# Patient Record
Sex: FEMALE | Race: WHITE | NOT HISPANIC OR LATINO | Employment: OTHER | URBAN - METROPOLITAN AREA
[De-identification: names, ages, dates, MRNs, and addresses within clinical notes are randomized per-mention and may not be internally consistent; named-entity substitution may affect disease eponyms.]

---

## 2017-10-26 ENCOUNTER — HOSPITAL ENCOUNTER (OUTPATIENT)
Dept: RADIOLOGY | Age: 62
Discharge: HOME | End: 2017-10-26
Payer: COMMERCIAL

## 2017-10-26 DIAGNOSIS — Z12.31 ENCOUNTER FOR SCREENING MAMMOGRAM FOR MALIGNANT NEOPLASM OF BREAST: ICD-10-CM

## 2017-10-26 PROCEDURE — G0202 SCR MAMMO BI INCL CAD: HCPCS

## 2018-07-13 ENCOUNTER — OFFICE VISIT (OUTPATIENT)
Dept: URGENT CARE | Age: 63
End: 2018-07-13
Payer: MEDICARE

## 2018-07-13 VITALS
WEIGHT: 167.8 LBS | RESPIRATION RATE: 16 BRPM | SYSTOLIC BLOOD PRESSURE: 169 MMHG | BODY MASS INDEX: 28.36 KG/M2 | HEART RATE: 68 BPM | DIASTOLIC BLOOD PRESSURE: 92 MMHG | OXYGEN SATURATION: 100 %

## 2018-07-13 DIAGNOSIS — S61.216A LACERATION OF RIGHT LITTLE FINGER WITHOUT FOREIGN BODY WITHOUT DAMAGE TO NAIL, INITIAL ENCOUNTER: Primary | ICD-10-CM

## 2018-07-13 PROCEDURE — G0463 HOSPITAL OUTPT CLINIC VISIT: HCPCS | Performed by: FAMILY MEDICINE

## 2018-07-13 PROCEDURE — 12001 RPR S/N/AX/GEN/TRNK 2.5CM/<: CPT | Performed by: FAMILY MEDICINE

## 2018-07-13 PROCEDURE — 99214 OFFICE O/P EST MOD 30 MIN: CPT | Performed by: FAMILY MEDICINE

## 2018-07-13 RX ORDER — TRIAMCINOLONE ACETONIDE 1 MG/G
CREAM TOPICAL 2 TIMES DAILY
COMMUNITY
Start: 2018-03-19 | End: 2019-06-26

## 2018-07-13 RX ORDER — PREDNISONE 10 MG/1
TABLET ORAL
COMMUNITY
Start: 2012-12-03 | End: 2018-10-25 | Stop reason: ALTCHOICE

## 2018-07-13 RX ORDER — EPINEPHRINE 0.3 MG/.3ML
INJECTION SUBCUTANEOUS
COMMUNITY
Start: 2012-09-12

## 2018-07-13 RX ORDER — ALPRAZOLAM 0.5 MG/1
1 TABLET ORAL DAILY
COMMUNITY
Start: 2018-04-13

## 2018-07-13 RX ORDER — LEVOTHYROXINE SODIUM 0.03 MG/1
TABLET ORAL
COMMUNITY
Start: 2013-01-26

## 2018-07-13 RX ORDER — FLUTICASONE PROPIONATE 50 MCG
2 SPRAY, SUSPENSION (ML) NASAL
COMMUNITY
Start: 2017-11-20 | End: 2019-06-26

## 2018-07-14 NOTE — PATIENT INSTRUCTIONS
Keep hand clean using soap and water  Pat dry  You may apply an antibiotic ointment such as triple antibiotic ointment, as desired  Monitor for signs of worsening infection including increased redness, streaking redness, pus drainage, fever, and chills  If these occur seek care immediately  Follow up with your family doctor in 5-7 days  Proceed to the ER if symptoms worsen  Care For Your Stitches   WHAT YOU NEED TO KNOW:   Stitches, or sutures, are used to close cuts and wounds on the skin  Stitches need to be removed after your wound has healed  DISCHARGE INSTRUCTIONS:   Return to the emergency department if:   · Your stitches come apart  · Blood soaks through your bandages  · You suddenly cannot move your injured joint  · You have sudden numbness around your wound  · You see red streaks coming from your wound  Contact your healthcare provider if:   · You have a fever and chills  · Your wound is red, warm, swollen, or leaking pus  · There is a bad smell coming from your wound  · You have increased pain in the wound area  · You have questions or concerns about your condition or care  Care for your stitches:  Keep your stitches clean and dry  You may need to cover your stitches with a bandage for 24 to 48 hours, or as directed  Do not bump or hit the suture area, as this could open the wound  Do not trim or shorten the ends of your stitches  If they rub on your clothing, put a gauze bandage between the stitches and your clothes  Clean your wound:  Carefully wash your wound with soap and water  For mouth and lip wounds, rinse your mouth after meals and at bedtime  Ask your healthcare provider what to use to rinse your mouth  If you have a scalp wound, you may gently wash your hair every 2 days with mild shampoo  Do not use hair products, such as hair spray  Help your wound heal:   · Elevate  your wound above the level of your heart as often as you can   This will help decrease swelling and pain  Prop your wound on pillows or blankets to keep it elevated comfortably  · Limit activity  Do not stretch the skin around your wound  This will help prevent bleeding and swelling of the wound area  Follow up with your healthcare provider as directed: You may need to return to have your stitches removed  Write down your questions so you remember to ask them during your visits  © 2017 [employer] Information is for End User's use only and may not be sold, redistributed or otherwise used for commercial purposes  All illustrations and images included in CareNotes® are the copyrighted property of [employer] , [employer]  or Jed Ospina  The above information is an  only  It is not intended as medical advice for individual conditions or treatments  Talk to your doctor, nurse or pharmacist before following any medical regimen to see if it is safe and effective for you

## 2018-07-17 NOTE — PROGRESS NOTES
[facility]        NAME: Miriam Ochoa is a 70 y o  female  : 1955    MRN: [id number]  DATE: 2018  TIME: 8:25 PM    Assessment and Plan   Laceration of right little finger without foreign body without damage to nail, initial encounter [S61 216A]  1  Laceration of right little finger without foreign body without damage to nail, initial encounter  Laceration repair       Patient Instructions   Keep hand clean using soap and water  Pat dry  You may apply an antibiotic ointment such as triple antibiotic ointment, as desired  Monitor for signs of worsening infection including increased redness, streaking redness, pus drainage, fever, and chills  If these occur seek care immediately  Follow up with your family doctor in 5-7 days  Proceed to the ER if symptoms worsen  Pt to return to this office or seek care by PCP for suture removal in 7 days  Chief Complaint     Chief Complaint   Patient presents with    Laceration     Pt cut her finger on a glass dish tonight         History of Present Illness        78-year-old female presents for evaluation of laceration of right small finger x1 day  She notes that just prior to presentation she was washing dishes and dropped a large glass bowl into her sink  In an attempt to catch the the bowl, a piece of shattered glass cut her finger  She did attempt to clean using soap and water however presented due to inability to stop bleeding  She denies taking any blood thinners  No numbness, tingling, or weakness  She admits to being out of date on her tetanus vaccine but does not wish to have vaccine administered noting allergy to components of vaccine and preferring her PCP to update her if necessary  Review of Systems   Review of Systems   Constitutional: Negative for chills and fever  Respiratory: Negative for cough, shortness of breath and wheezing  Cardiovascular: Negative for chest pain and palpitations     Gastrointestinal: Negative for abdominal pain and nausea  Neurological: Negative for dizziness, weakness, light-headedness, numbness and headaches  Current Medications       Current Outpatient Prescriptions:     ALPRAZolam (XANAX) 0 5 mg tablet, Take 1 mg by mouth daily, Disp: , Rfl:     EPINEPHrine (EPIPEN) 0 3 mg/0 3 mL SOAJ, Inject as directed, Disp: , Rfl:     fluocinonide (LIDEX) 0 05 % cream, Apply topically 2 (two) times a day as needed, Disp: , Rfl:     fluticasone (FLONASE) 50 mcg/act nasal spray, 2 sprays into each nostril, Disp: , Rfl:     levothyroxine (SYNTHROID) 25 mcg tablet, Take by mouth, Disp: , Rfl:     predniSONE 10 mg tablet, Take by mouth, Disp: , Rfl:     triamcinolone (KENALOG) 0 1 % cream, Apply topically 2 (two) times a day, Disp: , Rfl:     Cetirizine HCl 10 MG CAPS, Take 1 tablet by mouth, Disp: , Rfl:     Melatonin 5 MG/15ML LIQD, Take 2 mg by mouth, Disp: , Rfl:     Selenium 200 MCG CAPS, Take 1 capsule by mouth, Disp: , Rfl:     Current Allergies     Allergies as of 07/13/2018 - Reviewed 07/13/2018   Allergen Reaction Noted    Penicillins Rash 11/19/2015    Food  09/10/2012    Gluten meal  05/22/2015    Latex  09/10/2012    Sulfa antibiotics Lip Swelling 09/10/2012    Zolpidem Headache 09/10/2012    Amoxicillin Rash 12/01/2012            The following portions of the patient's history were reviewed and updated as appropriate: allergies, current medications, past family history, past medical history, past social history, past surgical history and problem list      No past medical history on file  No past surgical history on file  No family history on file  Medications have been verified  Objective   /92   Pulse 68   Resp 16   Wt 76 1 kg (167 lb 12 8 oz)   SpO2 100%   BMI 28 36 kg/m²          Physical Exam     Physical Exam   Constitutional: She is oriented to person, place, and time  She appears well-developed and well-nourished  No distress  HENT:   Head: Normocephalic and atraumatic  Eyes: Conjunctivae are normal    Cardiovascular: Normal rate, regular rhythm and normal heart sounds  Pulmonary/Chest: Effort normal and breath sounds normal  No respiratory distress  She has no decreased breath sounds  She has no wheezes  She has no rhonchi  She has no rales  Neurological: She is alert and oriented to person, place, and time  No cranial nerve deficit  She exhibits normal muscle tone  Coordination normal    Skin: Skin is warm and dry  No rash noted  She is not diaphoretic    ~0 5 cm avulsion laceration of the R little finger  Bleeding persists  No visible foreign bodies  Surrounding skin appears clean and dry  FROM of small finger joints  Distal sensation intact  Capillary refill <3 seconds  Psychiatric: She has a normal mood and affect  Her behavior is normal    Nursing note and vitals reviewed  Laceration repair  Date/Time: 7/16/2018 8:23 PM  Performed by: Lorena Romero  Authorized by: Lorena Romero   Consent: Verbal consent obtained  Consent given by: patient  Patient identity confirmed: verbally with patient  Body area: upper extremity  Location details: right small finger  Laceration length: 0 5 cm  Foreign bodies: no foreign bodies  Tendon involvement: none  Nerve involvement: none  Vascular damage: no  Anesthesia: local infiltration    Anesthesia:  Local Anesthetic: lidocaine 1% without epinephrine  Anesthetic total: 1 5 mL      Procedure Details:  Preparation: Patient was prepped and draped in the usual sterile fashion  Irrigation solution: saline  Irrigation method: syringe  Amount of cleaning: standard  Debridement: none  Degree of undermining: none  Skin closure: 4-0 nylon and Ethilon  Number of sutures: 3    Technique: simple  Approximation: close  Approximation difficulty: simple  Dressing: antibiotic ointment (bandage)  Patient tolerance: Patient tolerated the procedure well with no immediate complications

## 2018-10-25 ENCOUNTER — OFFICE VISIT (OUTPATIENT)
Dept: URGENT CARE | Age: 63
End: 2018-10-25
Payer: COMMERCIAL

## 2018-10-25 VITALS
OXYGEN SATURATION: 99 % | DIASTOLIC BLOOD PRESSURE: 67 MMHG | HEART RATE: 86 BPM | WEIGHT: 177 LBS | HEIGHT: 65 IN | TEMPERATURE: 97.9 F | BODY MASS INDEX: 29.49 KG/M2 | SYSTOLIC BLOOD PRESSURE: 122 MMHG | RESPIRATION RATE: 16 BRPM

## 2018-10-25 DIAGNOSIS — S30.861A TICK BITE OF ABDOMEN, INITIAL ENCOUNTER: Primary | ICD-10-CM
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
## 2018-10-25 PROCEDURE — S9083 URGENT CARE CENTER GLOBAL: HCPCS | Performed by: FAMILY MEDICINE

## 2018-10-25 PROCEDURE — G0382 LEV 3 HOSP TYPE B ED VISIT: HCPCS | Performed by: FAMILY MEDICINE

## 2018-10-25 RX ORDER — DOXYCYCLINE 100 MG/1
200 TABLET ORAL ONCE
Qty: 2 TABLET | Refills: 0 | Status: SHIPPED | OUTPATIENT
Start: 2018-10-25 | End: 2018-10-25

## 2018-10-25 RX ORDER — AZELASTINE 1 MG/ML
1 SPRAY, METERED NASAL 2 TIMES DAILY
COMMUNITY

## 2018-10-25 NOTE — PATIENT INSTRUCTIONS
Take all antibiotics as prescribed for tick exposure/Lyme prophylaxis    Call PCP to schedule a follow-up appt in the next few days for reevaluation and to ensure resolution of symptoms and for Lyme testing  Go to the nearest ER for evaluation if any fevers, redness, warmth, discharge, excessive bleeding, pain, signs of infection, new or worsening symptoms, or other concerning symptoms

## 2018-10-25 NOTE — PROGRESS NOTES
[facility]        NAME: Hamida Danielle is a 70 y o  female  : 1955    MRN: [id number]  DATE: 2018  TIME: 7:04 PM    Assessment and Plan   Tick bite of abdomen, initial encounter [H58 200E, W57  XXXA]  1  Tick bite of abdomen, initial encounter  doxycycline (ADOXA) 100 MG tablet   2  Tick bite with subsequent removal of tick           Patient Instructions       Take all antibiotics as prescribed for tick exposure/Lyme prophylaxis    Call PCP to schedule a follow-up appt in the next few days for reevaluation and to ensure resolution of symptoms and for Lyme testing  Go to the nearest ER for evaluation if any fevers, redness, warmth, discharge, excessive bleeding, pain, signs of infection, new or worsening symptoms, or other concerning symptoms  Chief Complaint     Chief Complaint   Patient presents with    Tick Removal     NOT SURE WHEN IT OCCURED    History of Present Illness       78-year-old female presents with an engorged tick to the right side of her abdomen  States she noticed it today  [place]  Did not notice a tick earlier this week so thinks that must have got there in the past 48 hours or so  Some mild redness around the tick, but no other rashes  No fevers, no joint pain or other complaints  States she has had ticks before but she could not get this one off  Declines tetnus at this time  Review of Systems   Review of Systems   Constitutional: Negative for chills and fever  HENT: Negative for facial swelling, sore throat, trouble swallowing and voice change  Eyes: Negative for visual disturbance  Respiratory: Negative for cough and shortness of breath  Cardiovascular: Negative for chest pain  Gastrointestinal: Negative for abdominal pain, nausea and vomiting  Musculoskeletal: Negative for arthralgias, back pain and myalgias  Skin: Negative for rash          Engorged Tick on right side of abdomen   Neurological: Negative for dizziness, syncope, facial asymmetry, numbness and headaches  All other systems reviewed and are negative          Current Medications       Current Outpatient Prescriptions:     ALPRAZolam (XANAX) 0 5 mg tablet, Take 1 mg by mouth daily, Disp: , Rfl:     azelastine (ASTELIN) 0 1 % nasal spray, 1 spray into each nostril 2 (two) times a day Use in each nostril as directed, Disp: , Rfl:     Cetirizine HCl 10 MG CAPS, Take 1 tablet by mouth, Disp: , Rfl:     fluticasone (FLONASE) 50 mcg/act nasal spray, 2 sprays into each nostril, Disp: , Rfl:     levothyroxine (SYNTHROID) 25 mcg tablet, Take by mouth, Disp: , Rfl:     Melatonin 5 MG/15ML LIQD, Take 2 mg by mouth, Disp: , Rfl:     Selenium 200 MCG CAPS, Take 1 capsule by mouth, Disp: , Rfl:     doxycycline (ADOXA) 100 MG tablet, Take 2 tablets (200 mg total) by mouth once for 1 dose, Disp: 2 tablet, Rfl: 0    EPINEPHrine (EPIPEN) 0 3 mg/0 3 mL SOAJ, Inject as directed, Disp: , Rfl:     fluocinonide (LIDEX) 0 05 % cream, Apply topically 2 (two) times a day as needed, Disp: , Rfl:     triamcinolone (KENALOG) 0 1 % cream, Apply topically 2 (two) times a day, Disp: , Rfl:     Current Allergies     Allergies as of 10/25/2018 - Reviewed 10/25/2018   Allergen Reaction Noted    Gluten meal  05/22/2015    Latex  09/10/2012    Zolpidem Headache 09/10/2012            The following portions of the patient's history were reviewed and updated as appropriate: allergies, current medications, past family history, past medical history, past social history, past surgical history and problem list      Past Medical History:   Diagnosis Date    Allergic     Anxiety     Disease of thyroid gland     Hypotension        Past Surgical History:   Procedure Laterality Date    BASAL CELL CARCINOMA EXCISION         Family History   Problem Relation Age of Onset    Cancer Mother     Heart disease Mother     Aneurysm Father Medications have been verified  Objective   /67   Pulse 86   Temp 97 9 °F (36 6 °C) (Temporal)   Resp 16   Ht 5' 5" (1 651 m)   Wt 80 3 kg (177 lb)   SpO2 99%   BMI 29 45 kg/m²        Physical Exam     Physical Exam   Constitutional: She is oriented to person, place, and time  She appears well-developed and well-nourished  No distress  HENT:   Head: Normocephalic and atraumatic  Mouth/Throat: Oropharynx is clear and moist    Eyes: Pupils are equal, round, and reactive to light  Conjunctivae are normal    Neck: Normal range of motion  Neck supple  Cardiovascular: Normal rate, regular rhythm and normal heart sounds  Pulmonary/Chest: Effort normal and breath sounds normal  She has no wheezes  Musculoskeletal: Normal range of motion  Neurological: She is alert and oriented to person, place, and time  She has normal reflexes  Skin: Skin is warm and dry  Engorged tick on right side of abdomen, mild localized erythema   Psychiatric: She has a normal mood and affect  Her behavior is normal    Nursing note and vitals reviewed  Foreign body removal  Date/Time: 10/25/2018 7:02 PM  Performed by: Macario Coronado  Authorized by: Char Polanco Protocol:Consent: Verbal consent obtained  Risks and benefits: risks, benefits and alternatives were discussed  Consent given by: patient  Patient understanding: patient states understanding of the procedure being performed  Patient identity confirmed: verbally with patient    Body area: skin  General location: trunk  Location details: abdomen  1 objects recovered  Objects recovered: tick  Post-procedure assessment: foreign body removed  Patient tolerance: Patient tolerated the procedure well with no immediate complications  Comments: Tick completely removed, small puncture present after complete removal  Mild local erythema   Nontender to palpation

## 2018-11-01 ENCOUNTER — HOSPITAL ENCOUNTER (OUTPATIENT)
Dept: RADIOLOGY | Age: 63
Discharge: HOME | End: 2018-11-01
Payer: COMMERCIAL

## 2018-11-01 DIAGNOSIS — Z12.31 ENCOUNTER FOR SCREENING MAMMOGRAM FOR MALIGNANT NEOPLASM OF BREAST: ICD-10-CM

## 2018-11-01 PROCEDURE — 77067 SCR MAMMO BI INCL CAD: CPT

## 2019-03-18 ENCOUNTER — OFFICE VISIT (OUTPATIENT)
Dept: URGENT CARE | Age: 64
End: 2019-03-18
Payer: MEDICARE

## 2019-03-18 VITALS
DIASTOLIC BLOOD PRESSURE: 75 MMHG | HEART RATE: 90 BPM | RESPIRATION RATE: 20 BRPM | SYSTOLIC BLOOD PRESSURE: 134 MMHG | TEMPERATURE: 99.1 F | OXYGEN SATURATION: 96 %

## 2019-03-18 DIAGNOSIS — S61.451A CAT BITE OF RIGHT HAND, INITIAL ENCOUNTER: Primary | ICD-10-CM
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
## 2019-03-18 PROCEDURE — G0463 HOSPITAL OUTPT CLINIC VISIT: HCPCS | Performed by: FAMILY MEDICINE

## 2019-03-18 PROCEDURE — 90715 TDAP VACCINE 7 YRS/> IM: CPT

## 2019-03-18 PROCEDURE — 99213 OFFICE O/P EST LOW 20 MIN: CPT | Performed by: FAMILY MEDICINE

## 2019-03-18 RX ORDER — CEFUROXIME AXETIL 500 MG/1
500 TABLET ORAL EVERY 12 HOURS SCHEDULED
Qty: 20 TABLET | Refills: 0 | Status: SHIPPED | OUTPATIENT
Start: 2019-03-18 | End: 2019-03-28

## 2019-03-18 NOTE — PROGRESS NOTES
[facility]        NAME: Bishop Herron is a 75 y o  female  : 1955    MRN: [id number]  DATE: 2019  TIME: 2:54 PM    Assessment and Plan   Cat bite of right hand, initial encounter Minus Piedra  1  Cat bite of right hand, initial encounter  cefuroxime (CEFTIN) 500 mg tablet    TDAP VACCINE GREATER THAN OR EQUAL TO 8YO IM         Patient Instructions       Follow up with PCP in 3-5 days  Proceed to  ER if symptoms worsen  Chief Complaint     Chief Complaint   Patient presents with    Animal Bite     Cat bite  on right hand is swolllen and red it's been for 2 days  History of Present Illness       Patient is here for evaluation of a cat bite she sustained to her right hand on Saturday night  Patient states her own cat bit her when she was trying to trim her nails  She states the cat is 25years old and does not go outdoors at all  The cats last review vaccination was in   She states her cat is acting normal   Patient has been doing some home remedies to the area as well as warm compresses with some relief  She states that the redness and swelling is persisting so she came in to get checked out  Her last tetanus vaccination is over 10 years ago  Review of Systems   Review of Systems   Constitutional: Negative  Musculoskeletal: Negative  Skin: Positive for wound (Right hand)  Neurological: Negative            Current Medications       Current Outpatient Medications:     ALPRAZolam (XANAX) 0 5 mg tablet, Take 1 mg by mouth daily, Disp: , Rfl:     azelastine (ASTELIN) 0 1 % nasal spray, 1 spray into each nostril 2 (two) times a day Use in each nostril as directed, Disp: , Rfl:     cefuroxime (CEFTIN) 500 mg tablet, Take 1 tablet (500 mg total) by mouth every 12 (twelve) hours for 10 days, Disp: 20 tablet, Rfl: 0    Cetirizine HCl 10 MG CAPS, Take 1 tablet by mouth, Disp: , Rfl:     EPINEPHrine (EPIPEN) 0 3 mg/0 3 mL SOAJ, Inject as directed, Disp: , Rfl:     fluocinonide (LIDEX) 0 05 % cream, Apply topically 2 (two) times a day as needed, Disp: , Rfl:     fluticasone (FLONASE) 50 mcg/act nasal spray, 2 sprays into each nostril, Disp: , Rfl:     levothyroxine (SYNTHROID) 25 mcg tablet, Take by mouth, Disp: , Rfl:     Melatonin 5 MG/15ML LIQD, Take 2 mg by mouth, Disp: , Rfl:     Selenium 200 MCG CAPS, Take 1 capsule by mouth, Disp: , Rfl:     triamcinolone (KENALOG) 0 1 % cream, Apply topically 2 (two) times a day, Disp: , Rfl:     Current Allergies     Allergies as of 03/18/2019 - Reviewed 03/18/2019   Allergen Reaction Noted    Gluten meal  05/22/2015    Latex  09/10/2012    Sulfasalazine Swelling 09/10/2012    Zolpidem Headache and Other (See Comments) 09/10/2012            The following portions of the patient's history were reviewed and updated as appropriate: allergies, current medications, past family history, past medical history, past social history, past surgical history and problem list      Past Medical History:   Diagnosis Date    Allergic     Anxiety     Disease of thyroid gland     Hypotension        Past Surgical History:   Procedure Laterality Date    BASAL CELL CARCINOMA EXCISION         Family History   Problem Relation Age of Onset    Cancer Mother     Heart disease Mother     Aneurysm Father          Medications have been verified  Objective   /75   Pulse 90   Temp 99 1 °F (37 3 °C)   Resp 20   SpO2 96%        Physical Exam     Physical Exam   Constitutional: She is oriented to person, place, and time  She appears well-developed and well-nourished  No distress  Musculoskeletal:   Puncture wound on the dorsal aspect of the right hand at the thenar eminence  There is focal soft tissue swelling and erythema  Patient has full range of motion of the hand  No current discharge  Warmth is present to the area of erythema  Neurological: She is alert and oriented to person, place, and time     Skin: Skin is warm and dry  She is not diaphoretic  Psychiatric: She has a normal mood and affect  Her behavior is normal    Nursing note and vitals reviewed

## 2019-03-18 NOTE — PATIENT INSTRUCTIONS
1   Warm water Epsom salt soaks    2  Take probiotics [i e  Yogurt, Acidophilus, Florastor (liquid)] daily  3   Over-the-counter medications as needed for symptomatic care  4    Advance activities as tolerated  5    Follow-up with your primary care physician in 3-4 days  6   Go to emergency room if symptoms are worsening

## 2019-03-21 PROBLEM — J38.3 VOCAL CORD DYSFUNCTION: Status: ACTIVE | Noted: 2019-03-21

## 2019-03-21 PROBLEM — K90.41 GLUTEN INTOLERANCE: Status: ACTIVE | Noted: 2019-03-21

## 2019-03-21 PROBLEM — K21.9 LPRD (LARYNGOPHARYNGEAL REFLUX DISEASE): Status: ACTIVE | Noted: 2019-03-21

## 2019-03-21 PROBLEM — IMO0001 ALLERGIC REACTION, URTICARIA: Status: ACTIVE | Noted: 2019-03-21

## 2019-03-21 PROBLEM — J31.0 CHRONIC RHINITIS: Status: ACTIVE | Noted: 2019-03-21

## 2019-05-02 PROBLEM — R01.1 MURMUR: Status: ACTIVE | Noted: 2018-07-25

## 2019-05-02 PROBLEM — L50.1 CHRONIC IDIOPATHIC URTICARIA: Status: ACTIVE | Noted: 2019-05-02

## 2019-05-02 PROBLEM — D75.839 THROMBOCYTOSIS: Status: ACTIVE | Noted: 2017-11-30

## 2019-05-09 ENCOUNTER — TRANSCRIBE ORDERS (OUTPATIENT)
Dept: ADMINISTRATIVE | Facility: HOSPITAL | Age: 64
End: 2019-05-09

## 2019-05-09 DIAGNOSIS — Z78.0 POSTMENOPAUSAL: Primary | ICD-10-CM

## 2019-11-07 ENCOUNTER — HOSPITAL ENCOUNTER (OUTPATIENT)
Dept: RADIOLOGY | Age: 64
Discharge: HOME | End: 2019-11-07
Payer: MEDICARE

## 2019-11-07 VITALS — BODY MASS INDEX: 27.49 KG/M2 | HEIGHT: 65 IN | WEIGHT: 165 LBS

## 2019-11-07 DIAGNOSIS — Z12.31 ENCOUNTER FOR SCREENING MAMMOGRAM FOR MALIGNANT NEOPLASM OF BREAST: ICD-10-CM

## 2019-11-07 PROCEDURE — 77067 SCR MAMMO BI INCL CAD: CPT

## 2019-11-25 ENCOUNTER — HOSPITAL ENCOUNTER (OUTPATIENT)
Dept: RADIOLOGY | Age: 64
Discharge: HOME | End: 2019-11-25
Payer: MEDICARE

## 2019-11-25 DIAGNOSIS — Z78.0 POSTMENOPAUSAL: ICD-10-CM

## 2019-11-25 PROCEDURE — 77080 DXA BONE DENSITY AXIAL: CPT

## 2020-11-12 ENCOUNTER — HOSPITAL ENCOUNTER (OUTPATIENT)
Dept: RADIOLOGY | Age: 65
Discharge: HOME | End: 2020-11-12
Payer: MEDICARE

## 2020-11-12 VITALS — WEIGHT: 168 LBS | HEIGHT: 65 IN | BODY MASS INDEX: 27.99 KG/M2

## 2020-11-12 DIAGNOSIS — Z12.31 ENCOUNTER FOR SCREENING MAMMOGRAM FOR MALIGNANT NEOPLASM OF BREAST: ICD-10-CM

## 2020-11-12 PROCEDURE — 77063 BREAST TOMOSYNTHESIS BI: CPT

## 2020-11-12 PROCEDURE — 77067 SCR MAMMO BI INCL CAD: CPT

## 2021-03-22 ENCOUNTER — OFFICE VISIT (OUTPATIENT)
Dept: URGENT CARE | Age: 66
End: 2021-03-22
Payer: MEDICARE

## 2021-03-22 VITALS
OXYGEN SATURATION: 97 % | DIASTOLIC BLOOD PRESSURE: 78 MMHG | HEART RATE: 104 BPM | SYSTOLIC BLOOD PRESSURE: 159 MMHG | RESPIRATION RATE: 18 BRPM | TEMPERATURE: 98.9 F
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
## 2021-03-22 DIAGNOSIS — S61.452A CAT BITE OF LEFT HAND, INITIAL ENCOUNTER: Primary | ICD-10-CM
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
## 2021-03-22 DIAGNOSIS — S61.451A CAT BITE OF RIGHT HAND, INITIAL ENCOUNTER: ICD-10-CM

## 2021-03-22 PROCEDURE — G0463 HOSPITAL OUTPT CLINIC VISIT: HCPCS | Performed by: NURSE PRACTITIONER

## 2021-03-22 PROCEDURE — 99213 OFFICE O/P EST LOW 20 MIN: CPT | Performed by: NURSE PRACTITIONER

## 2021-03-22 RX ORDER — AMOXICILLIN AND CLAVULANATE POTASSIUM 875; 125 MG/1; MG/1
1 TABLET, FILM COATED ORAL EVERY 12 HOURS SCHEDULED
Qty: 20 TABLET | Refills: 0 | Status: SHIPPED | OUTPATIENT
Start: 2021-03-22 | End: 2021-04-01

## 2021-03-22 NOTE — PROGRESS NOTES
[facility]        NAME: Richard Narayanan is a 74 y o  female  : 1955    MRN: [id number]  DATE: 2021  TIME: 2:58 PM    Assessment and Plan   Cat bite of left hand, initial encounter Mikhail Ramos  1  Cat bite of left hand, initial encounter  amoxicillin-clavulanate (AUGMENTIN) 875-125 mg per tablet   2  Cat bite of right hand, initial encounter           Patient Instructions     Take meds as directed  Follow up with PCP in 3-5 days  Proceed to  ER if symptoms worsen  Chief Complaint     Chief Complaint   Patient presents with    Animal Bite     left wrist, left arm, and right arm , mutiple  cat bites and scratches x  today , tetanus -2019         History of Present Illness       HPI   Reports she took her cats to the vet and got scratch pretty good on both hands  This was about 1-2 hrs ago  Here for eval and treatment  Review of Systems   Review of Systems   Constitutional: Negative for chills and fever  Gastrointestinal: Negative for nausea and vomiting  Skin: Positive for wound (B/L hands)  Negative for color change  Neurological: Negative for weakness and numbness           Current Medications       Current Outpatient Medications:     ALPRAZolam (XANAX) 0 5 mg tablet, Take 1 mg by mouth daily, Disp: , Rfl:     amoxicillin-clavulanate (AUGMENTIN) 875-125 mg per tablet, Take 1 tablet by mouth every 12 (twelve) hours for 10 days, Disp: 20 tablet, Rfl: 0    azelastine (ASTELIN) 0 1 % nasal spray, 1 spray into each nostril 2 (two) times a day Use in each nostril as directed, Disp: , Rfl:     Cetirizine HCl 10 MG CAPS, Take 1 tablet by mouth, Disp: , Rfl:     dicyclomine (BENTYL) 10 mg capsule, TAKE 1 CAPSULE BY MOUTH FOUR TIMES DAILY AS NEEDED, Disp: , Rfl: 2    EPINEPHrine (EPIPEN) 0 3 mg/0 3 mL SOAJ, Inject as directed, Disp: , Rfl:     fluocinonide (LIDEX) 0 05 % cream, Apply topically 2 (two) times a day as needed, Disp: , Rfl:     fluticasone (FLONASE) 50 mcg/act nasal spray, 1 spray into each nostril daily, Disp: , Rfl:     levothyroxine (SYNTHROID) 25 mcg tablet, Take by mouth, Disp: , Rfl:     Melatonin 5 MG/15ML LIQD, Take 2 mg by mouth, Disp: , Rfl:     omeprazole (PriLOSEC) 20 mg delayed release capsule, Take 20 mg by mouth daily, Disp: , Rfl: 3    ranitidine (ZANTAC) 150 MG capsule, Take 150 mg by mouth 2 (two) times a day, Disp: , Rfl:     Selenium 200 MCG CAPS, Take 1 capsule by mouth, Disp: , Rfl:     SSD 1 % cream, APPLY TOPICALLY TWO TIMES DAILY UNTIL HEALED, Disp: , Rfl: 1    Current Allergies     Allergies as of 03/22/2021 - Reviewed 11/12/2020   Allergen Reaction Noted    Gluten meal  05/22/2015    Hornet venom  06/26/2019    Latex  09/10/2012    Nickel  06/26/2019    Psyllium  06/26/2019    Sulfasalazine Swelling 09/10/2012    Zolpidem Headache and Other (See Comments) 09/10/2012            The following portions of the patient's history were reviewed and updated as appropriate: allergies, current medications, past family history, past medical history, past social history, past surgical history and problem list      Past Medical History:   Diagnosis Date    Allergic     Anxiety     Disease of thyroid gland     GERD (gastroesophageal reflux disease)     Hypotension        Past Surgical History:   Procedure Laterality Date    BASAL CELL CARCINOMA EXCISION      COLONOSCOPY      WISDOM TOOTH EXTRACTION      X3       Family History   Problem Relation Age of Onset    Heart disease Mother     Uterine cancer Mother 50    Aneurysm Father     Hypertension Brother     No Known Problems Maternal Grandmother     No Known Problems Maternal Grandfather     No Known Problems Paternal Grandmother     No Known Problems Paternal Grandfather     Breast cancer Maternal Aunt 50    Prostate cancer Maternal Uncle          Medications have been verified          Objective   /78   Pulse 104   Temp 98 9 °F (37 2 °C)   Resp 18   SpO2 97% No LMP recorded  Patient is postmenopausal        Physical Exam     Physical Exam  Constitutional:       Appearance: She is not ill-appearing or diaphoretic  Skin:     Findings: No bruising or erythema  Comments: There are several bite marks on the bilateral hands and fingers  No bleeding  Wounds are cleaned with betadine  Antibiotic ointment applied  Covered with 2 x 2 gauze and taped  Neurological:      Mental Status: She is alert

## 2021-03-22 NOTE — PATIENT INSTRUCTIONS
Animal Bite   WHAT YOU NEED TO KNOW:   Animal bite injuries range from shallow cuts to deep, life-threatening wounds  An animal can cut or puncture the skin when it bites  Your skin may be torn from your body  Your skin may swell or bruise even if the bite does not break the skin  Animal bites occur more often on the hands, arms, legs, and face  Bites from dogs and cats are the most common injuries  DISCHARGE INSTRUCTIONS:   Return to the emergency department if:   · You have a fever  · Your wound is red, swollen, and draining pus  · You see red streaks on the skin around the wound  · You can no longer move the bitten area  · Your heartbeat and breathing are much faster than usual     · You feel dizzy and confused  Contact your healthcare provider if:   · Your pain does not get better, even after you take pain medicine  · You have nightmares or flashbacks about the animal bite  · You have questions or concerns about your condition or care  Medicines: You may need any of the following:  · Antibiotics  prevent or treat a bacterial infection  · Prescription pain medicine  may be given  Ask how to take this medicine safely  · A tetanus vaccine  may be needed to prevent tetanus  Tetanus is a life-threatening bacterial infection that affects the nerves and muscles  The bacteria can be spread through animal bites  · A rabies vaccine  may be needed to prevent rabies  Rabies is a life-threatening viral infection  The virus can be spread through animal bites  · Take your medicine as directed  Contact your healthcare provider if you think your medicine is not helping or if you have side effects  Tell him of her if you are allergic to any medicine  Keep a list of the medicines, vitamins, and herbs you take  Include the amounts, and when and why you take them  Bring the list or the pill bottles to follow-up visits  Carry your medicine list with you in case of an emergency      Follow up with your healthcare provider in 1 to 2 days: You may need to return to have your stitches removed  Write down your questions so you remember to ask them during your visits  Self-care:   · Apply antibiotic ointment as directed  This helps prevent infection in minor skin wounds  It is available without a doctor's order  · Keep the wound clean and covered  Wash the wound every day with soap and water or germ-killing cleanser  Ask your healthcare provider about the kinds of bandages to use  · Apply ice on your wound  Ice helps decrease swelling and pain  Ice may also help prevent tissue damage  Use an ice pack, or put crushed ice in a plastic bag  Cover it with a towel and place it on your wound for 15 to 20 minutes every hour or as directed  · Elevate the wound area  Raise your wound above the level of your heart as often as you can  This will help decrease swelling and pain  Prop your wound on pillows or blankets to keep it elevated comfortably  Prevent another animal bite:   · Learn to recognize the signs of a scared or angry pet  Avoid quick, sudden movements  · Do not step between animals that are fighting  · Do not leave a pet alone with a young child  · Do not disturb an animal while it eats, sleeps, or cares for its young  · Do not approach an animal you do not know, especially one that is tied up or caged  · Stay away from animals that seem sick or act strangely  · Do not feed or capture wild animals  © Copyright [address] Information is for End User's use only and may not be sold, redistributed or otherwise used for commercial purposes  All illustrations and images included in CareNotes® are the copyrighted property of [employer]  or [address] [place], [place] [address]sylvia   The above information is an  only  It is not intended as medical advice for individual conditions or treatments   Talk to your doctor, nurse or pharmacist before following any medical regimen to see if it is safe and effective for you

## 2022-01-28 ENCOUNTER — NEW PATIENT (OUTPATIENT)
Dept: URBAN - METROPOLITAN AREA CLINIC 6 | Facility: CLINIC | Age: 67
End: 2022-01-28

## 2022-01-28 DIAGNOSIS — H52.13: ICD-10-CM

## 2022-01-28 DIAGNOSIS — H25.13: ICD-10-CM

## 2022-01-28 PROCEDURE — 92004 COMPRE OPH EXAM NEW PT 1/>: CPT

## 2022-01-28 PROCEDURE — 92015 DETERMINE REFRACTIVE STATE: CPT

## 2022-01-28 ASSESSMENT — VISUAL ACUITY
OD_GLARE: 20/40-1
OS_CC: 20/25+2
OU_CC: J1
OS_PH: 20/20+2
OS_GLARE: 20/25-1
OD_CC: 20/25+2

## 2022-01-28 ASSESSMENT — TONOMETRY
OD_IOP_MMHG: 12
OS_IOP_MMHG: 12

## 2022-02-14 ENCOUNTER — HOSPITAL ENCOUNTER (OUTPATIENT)
Dept: RADIOLOGY | Age: 67
Discharge: HOME | End: 2022-02-14
Payer: MEDICARE

## 2022-02-14 VITALS — BODY MASS INDEX: 27.99 KG/M2 | HEIGHT: 65 IN | WEIGHT: 168 LBS

## 2022-02-14 DIAGNOSIS — Z12.31 ENCOUNTER FOR SCREENING MAMMOGRAM FOR MALIGNANT NEOPLASM OF BREAST: ICD-10-CM

## 2022-02-14 PROCEDURE — 77063 BREAST TOMOSYNTHESIS BI: CPT

## 2022-02-14 PROCEDURE — 77067 SCR MAMMO BI INCL CAD: CPT

## 2022-02-18 ENCOUNTER — GLASSES CHECK (OUTPATIENT)
Dept: URBAN - METROPOLITAN AREA CLINIC 6 | Facility: CLINIC | Age: 67
End: 2022-02-18

## 2022-02-18 DIAGNOSIS — H52.4: ICD-10-CM

## 2022-02-18 DIAGNOSIS — H52.13: ICD-10-CM

## 2022-02-18 DIAGNOSIS — H52.203: ICD-10-CM

## 2022-02-18 PROCEDURE — 92015 DETERMINE REFRACTIVE STATE: CPT | Mod: NC

## 2022-02-18 ASSESSMENT — VISUAL ACUITY
OD_SC: J1
OD_SC: 20/25-1
OS_SC: 20/25-1
OS_SC: J1

## 2023-02-15 ENCOUNTER — OFFICE VISIT (OUTPATIENT)
Dept: URGENT CARE | Age: 68
End: 2023-02-15

## 2023-02-15 VITALS
HEIGHT: 64 IN | BODY MASS INDEX: 29.88 KG/M2 | RESPIRATION RATE: 18 BRPM | HEART RATE: 59 BPM | TEMPERATURE: 97.4 F | OXYGEN SATURATION: 98 % | WEIGHT: 175 LBS | DIASTOLIC BLOOD PRESSURE: 67 MMHG | SYSTOLIC BLOOD PRESSURE: 125 MMHG

## 2023-02-15 DIAGNOSIS — S61.216A LACERATION OF RIGHT LITTLE FINGER WITHOUT FOREIGN BODY WITHOUT DAMAGE TO NAIL, INITIAL ENCOUNTER: Primary | ICD-10-CM

## 2023-02-15 RX ORDER — LIDOCAINE HYDROCHLORIDE 20 MG/ML
5 INJECTION, SOLUTION EPIDURAL; INFILTRATION; INTRACAUDAL; PERINEURAL ONCE
Status: COMPLETED | OUTPATIENT
Start: 2023-02-15 | End: 2023-02-15

## 2023-02-15 RX ADMIN — LIDOCAINE HYDROCHLORIDE 5 ML: 20 INJECTION, SOLUTION EPIDURAL; INFILTRATION; INTRACAUDAL; PERINEURAL at 15:18

## 2023-02-15 NOTE — PATIENT INSTRUCTIONS
Finger Laceration   WHAT YOU NEED TO KNOW:   A finger laceration is a deep cut in your skin  Your blood vessels, bones, joints, tendons, or nerves may also be injured  DISCHARGE INSTRUCTIONS:   Return to the emergency department if:   Your wound comes apart  Blood soaks through your bandage  You have severe pain in your finger or hand  Your finger is pale and cold  You have sudden trouble moving your finger  Your swelling suddenly gets worse  You have red streaks on your skin coming from your wound  Call your doctor or hand specialist if:   You have new numbness or tingling  Your finger feels warm, looks swollen or red, and is draining pus  You have a fever  You have questions or concerns about your condition or care  Medicines: You may  need any of the following:  Antibiotics  help prevent a bacterial infection  Acetaminophen  decreases pain and fever  It is available without a doctor's order  Ask how much to take and how often to take it  Follow directions  Read the labels of all other medicines you are using to see if they also contain acetaminophen, or ask your doctor or pharmacist  Acetaminophen can cause liver damage if not taken correctly  Do not use more than 4 grams (4,000 milligrams) total of acetaminophen in one day  Prescription pain medicine  may be given  Ask your healthcare provider how to take this medicine safely  Some prescription pain medicines contain acetaminophen  Do not take other medicines that contain acetaminophen without talking to your healthcare provider  Too much acetaminophen may cause liver damage  Prescription pain medicine may cause constipation  Ask your healthcare provider how to prevent or treat constipation  Take your medicine as directed  Contact your healthcare provider if you think your medicine is not helping or if you have side effects  Tell him or her if you are allergic to any medicine   Keep a list of the medicines, vitamins, and herbs you take  Include the amounts, and when and why you take them  Bring the list or the pill bottles to follow-up visits  Carry your medicine list with you in case of an emergency  Self-care:   Apply ice  on your finger for 15 to 20 minutes every hour or as directed  Use an ice pack, or put crushed ice in a plastic bag  Cover it with a towel before you apply it to your skin  Ice helps prevent tissue damage and decreases swelling and pain  Elevate  your hand above the level of your heart as often as you can  This will help decrease swelling and pain  Prop your hand on pillows or blankets to keep it elevated comfortably  Wear your splint as directed  A splint will decrease movement and stress on your wound  The splint may help your wound heal faster  Ask your healthcare provider how to apply and remove a splint  Apply ointments to decrease scarring  Do not apply ointments until your healthcare provider says it is okay  You may need to wait until your wound is healed  Ask which ointment to buy and how often to use it  Wound care:   Do not get your wound wet until your healthcare provider says it is okay  Do not soak your hand in water  Do not go swimming until your healthcare provider says it is okay  When your healthcare provider says it is okay, carefully wash around the wound with soap and water  Let soap and water run over your wound  Gently pat the area dry or allow it to air dry  Change your bandages when they get wet, dirty, or after washing  Apply new, clean bandages as directed  Do not apply elastic bandages or tape too tightly  Do not put powders or lotions on your wound  Apply antibiotic ointment as directed  Your healthcare provider may give you antibiotic ointment to put over your wound if you have stitches  If you have Strips-Strips™ over your wound, let them dry up and fall off on their own  If they do not fall off within 14 days, gently remove them   If you have glue over your wound, do not remove or pick at it  If your glue comes off, do not replace it with glue that you have at home  Check your wound every day for signs of infection  Signs of infection include swelling, redness, or pus  Follow up with your doctor or hand specialist in 2 days:  Write down your questions so you remember to ask them during your visits  © Copyright [employer] 2022 Information is for End User's use only and may not be sold, redistributed or otherwise used for commercial purposes  All illustrations and images included in CareNotes® are the copyrighted property of [employer]  or [facility] Nahum Smyth   The above information is an  only  It is not intended as medical advice for individual conditions or treatments  Talk to your doctor, nurse or pharmacist before following any medical regimen to see if it is safe and effective for you

## 2023-02-15 NOTE — PROGRESS NOTES
[facility]        NAME: Rosiland Kayser is a 77 y o  female  : 1955    MRN: [id number]  DATE: February 15, 2023  TIME: 3:17 PM    Assessment and Plan   Laceration of right little finger without foreign body without damage to nail, initial encounter [S61 [id number]]  1  Laceration of right little finger without foreign body without damage to nail, initial encounter  lidocaine (PF) (XYLOCAINE-MPF) 2 % injection 5 mL        Laceration repair    Date/Time: 2/15/2023 3:16 PM  Performed by: Kelly Sorenson DO  Authorized by: Kelly Sorenson DO   Consent: Verbal consent obtained  Risks and benefits: risks, benefits and alternatives were discussed  Consent given by: patient  Patient understanding: patient states understanding of the procedure being performed  Required items: required blood products, implants, devices, and special equipment available  Patient identity confirmed: verbally with patient  Body area: upper extremity  Location details: right small finger  Laceration length: 2 cm  Foreign bodies: no foreign bodies  Tendon involvement: none  Nerve involvement: none  Vascular damage: no  Anesthesia: digital block    Anesthesia:  Local Anesthetic: lidocaine 2% without epinephrine  Anesthetic total: 3 mL    Sedation:  Patient sedated: no        Procedure Details:  Irrigation solution: saline  Irrigation method: syringe  Amount of cleaning: standard  Debridement: none  Degree of undermining: none  Skin closure: 5-0 nylon  Number of sutures: 5  Technique: simple  Approximation: close  Approximation difficulty: simple  Dressing: adhesive bandage  Patient tolerance: patient tolerated the procedure well with no immediate complications          Patient Instructions     Finger Laceration   WHAT YOU NEED TO KNOW:   A finger laceration is a deep cut in your skin  Your blood vessels, bones, joints, tendons, or nerves may also be injured    DISCHARGE INSTRUCTIONS:   Return to the emergency department if:   · Your wound comes apart  · Blood soaks through your bandage  · You have severe pain in your finger or hand  · Your finger is pale and cold  · You have sudden trouble moving your finger  · Your swelling suddenly gets worse  · You have red streaks on your skin coming from your wound  Call your doctor or hand specialist if:   · You have new numbness or tingling  · Your finger feels warm, looks swollen or red, and is draining pus  · You have a fever  · You have questions or concerns about your condition or care  Medicines: You may  need any of the following:  · Antibiotics  help prevent a bacterial infection  · Acetaminophen  decreases pain and fever  It is available without a doctor's order  Ask how much to take and how often to take it  Follow directions  Read the labels of all other medicines you are using to see if they also contain acetaminophen, or ask your doctor or pharmacist  Acetaminophen can cause liver damage if not taken correctly  Do not use more than 4 grams (4,000 milligrams) total of acetaminophen in one day  · Prescription pain medicine  may be given  Ask your healthcare provider how to take this medicine safely  Some prescription pain medicines contain acetaminophen  Do not take other medicines that contain acetaminophen without talking to your healthcare provider  Too much acetaminophen may cause liver damage  Prescription pain medicine may cause constipation  Ask your healthcare provider how to prevent or treat constipation  · Take your medicine as directed  Contact your healthcare provider if you think your medicine is not helping or if you have side effects  Tell him or her if you are allergic to any medicine  Keep a list of the medicines, vitamins, and herbs you take  Include the amounts, and when and why you take them  Bring the list or the pill bottles to follow-up visits  Carry your medicine list with you in case of an emergency      Self-care:   · Apply ice  on your finger for 15 to 20 minutes every hour or as directed  Use an ice pack, or put crushed ice in a plastic bag  Cover it with a towel before you apply it to your skin  Ice helps prevent tissue damage and decreases swelling and pain  · Elevate  your hand above the level of your heart as often as you can  This will help decrease swelling and pain  Prop your hand on pillows or blankets to keep it elevated comfortably  · Wear your splint as directed  A splint will decrease movement and stress on your wound  The splint may help your wound heal faster  Ask your healthcare provider how to apply and remove a splint  · Apply ointments to decrease scarring  Do not apply ointments until your healthcare provider says it is okay  You may need to wait until your wound is healed  Ask which ointment to buy and how often to use it  Wound care:   · Do not get your wound wet until your healthcare provider says it is okay  Do not soak your hand in water  Do not go swimming until your healthcare provider says it is okay  When your healthcare provider says it is okay, carefully wash around the wound with soap and water  Let soap and water run over your wound  Gently pat the area dry or allow it to air dry  · Change your bandages when they get wet, dirty, or after washing  Apply new, clean bandages as directed  Do not apply elastic bandages or tape too tightly  Do not put powders or lotions on your wound  · Apply antibiotic ointment as directed  Your healthcare provider may give you antibiotic ointment to put over your wound if you have stitches  If you have Strips-Strips™ over your wound, let them dry up and fall off on their own  If they do not fall off within 14 days, gently remove them  If you have glue over your wound, do not remove or pick at it  If your glue comes off, do not replace it with glue that you have at home  · Check your wound every day for signs of infection    Signs of infection include swelling, redness, or pus  Follow up with your doctor or hand specialist in 2 days:  Write down your questions so you remember to ask them during your visits  © Copyright [employer] 2022 Information is for End User's use only and may not be sold, redistributed or otherwise used for commercial purposes  All illustrations and images included in CareNotes® are the copyrighted property of [employer]  or [employer]my[employer]   The above information is an  only  It is not intended as medical advice for individual conditions or treatments  Talk to your doctor, nurse or pharmacist before following any medical regimen to see if it is safe and effective for you  Follow up with PCP in 3-5 days  Proceed to  ER if symptoms worsen  Chief Complaint     Chief Complaint   Patient presents with   • Finger Laceration     Patient sliced her finger while opening a can  She sliced her pinky finger on her right hand  It is towards the top of her finger near her fingernail  Her last tetanus shot was 3/18/2019         History of Present Illness       76 y/o female presents today for evaluation of a laceration of her right 5th finger that she sustained on the metal edge of a can of soup  Tetanus is up to date  Is not on any blood thinners  Does take a baby aspirin daily  Finger Laceration  Pertinent negatives include no abdominal pain, arthralgias, chest pain, chills, coughing, fatigue, fever, headaches, rash, sore throat or vomiting  Review of Systems   Review of Systems   Constitutional: Negative for chills, fatigue and fever  HENT: Negative for ear pain, postnasal drip, sore throat and trouble swallowing  Eyes: Negative for pain and visual disturbance  Respiratory: Negative for cough, chest tightness and shortness of breath  Cardiovascular: Negative for chest pain and palpitations  Gastrointestinal: Negative for abdominal pain and vomiting     Genitourinary: Negative for dysuria and hematuria  Musculoskeletal: Negative for arthralgias and back pain  Skin: Negative for color change and rash  Allergic/Immunologic: Negative for immunocompromised state  Neurological: Negative for dizziness, seizures, syncope, light-headedness and headaches  All other systems reviewed and are negative          Current Medications       Current Outpatient Medications:   •  ALPRAZolam (XANAX) 0 5 mg tablet, Take 1 mg by mouth daily, Disp: , Rfl:   •  azelastine (ASTELIN) 0 1 % nasal spray, 1 spray into each nostril 2 (two) times a day Use in each nostril as directed, Disp: , Rfl:   •  Cetirizine HCl 10 MG CAPS, Take 1 tablet by mouth, Disp: , Rfl:   •  dicyclomine (BENTYL) 10 mg capsule, TAKE 1 CAPSULE BY MOUTH FOUR TIMES DAILY AS NEEDED, Disp: , Rfl: 2  •  EPINEPHrine (EPIPEN) 0 3 mg/0 3 mL SOAJ, Inject as directed, Disp: , Rfl:   •  fluocinonide (LIDEX) 0 05 % cream, Apply topically 2 (two) times a day as needed, Disp: , Rfl:   •  fluticasone (FLONASE) 50 mcg/act nasal spray, 1 spray into each nostril daily, Disp: , Rfl:   •  levothyroxine 25 mcg tablet, Take by mouth, Disp: , Rfl:   •  Melatonin 5 MG/15ML LIQD, Take 2 mg by mouth, Disp: , Rfl:   •  omeprazole (PriLOSEC) 20 mg delayed release capsule, Take 20 mg by mouth daily, Disp: , Rfl: 3  •  ranitidine (ZANTAC) 150 MG capsule, Take 150 mg by mouth 2 (two) times a day, Disp: , Rfl:   •  Selenium 200 MCG CAPS, Take 1 capsule by mouth, Disp: , Rfl:   •  SSD 1 % cream, APPLY TOPICALLY TWO TIMES DAILY UNTIL HEALED, Disp: , Rfl: 1    Current Facility-Administered Medications:   •  lidocaine (PF) (XYLOCAINE-MPF) 2 % injection 5 mL, 5 mL, Infiltration, Once, Janes Claros, DO    Current Allergies     Allergies as of 02/15/2023 - Reviewed 02/15/2023   Allergen Reaction Noted   • Gluten meal - food allergy  05/22/2015   • Latex  09/10/2012   • Nickel  06/26/2019   • Psyllium  06/26/2019   • Zolpidem Headache and Other (See Comments) 09/10/2012 The following portions of the patient's history were reviewed and updated as appropriate: allergies, current medications, past family history, past medical history, past social history, past surgical history and problem list      Past Medical History:   Diagnosis Date   • Allergic    • Anxiety    • Disease of thyroid gland    • GERD (gastroesophageal reflux disease)    • Hypotension        Past Surgical History:   Procedure Laterality Date   • BASAL CELL CARCINOMA EXCISION     • COLONOSCOPY     • WISDOM TOOTH EXTRACTION      X3       Family History   Problem Relation Age of Onset   • Heart disease Mother    • Uterine cancer Mother 50   • Aneurysm Father    • Hypertension Brother    • No Known Problems Maternal Grandmother    • No Known Problems Maternal Grandfather    • No Known Problems Paternal Grandmother    • No Known Problems Paternal Grandfather    • Breast cancer Maternal Aunt 48   • Prostate cancer Maternal Uncle          Medications have been verified  Objective   /67   Pulse 59   Temp (!) 97 4 °F (36 3 °C)   Resp 18   Ht 5' 4" (1 626 m)   Wt 79 4 kg (175 lb)   SpO2 98%   BMI 30 04 kg/m²        Physical Exam     Physical Exam  Vitals and nursing note reviewed  Constitutional:       Appearance: Normal appearance  Musculoskeletal:         General: Normal range of motion  Comments: 2cm laceration on the medial surface of the right 5th digit  No damage to the nail  No foreign body present  NV intact distally  Skin:     General: Skin is warm and dry  Capillary Refill: Capillary refill takes less than 2 seconds  Neurological:      General: No focal deficit present  Mental Status: She is alert and oriented to person, place, and time     Psychiatric:         Mood and Affect: Mood normal          Behavior: Behavior normal

## 2023-02-16 ENCOUNTER — TELEPHONE (OUTPATIENT)
Dept: URGENT CARE | Facility: MEDICAL CENTER | Age: 68
End: 2023-02-16

## 2023-02-22 ENCOUNTER — OFFICE VISIT (OUTPATIENT)
Dept: URGENT CARE | Age: 68
End: 2023-02-22

## 2023-02-22 VITALS
RESPIRATION RATE: 18 BRPM | OXYGEN SATURATION: 98 % | TEMPERATURE: 97.8 F | HEART RATE: 84 BPM | SYSTOLIC BLOOD PRESSURE: 146 MMHG | DIASTOLIC BLOOD PRESSURE: 66 MMHG

## 2023-02-22 DIAGNOSIS — Z48.02 ENCOUNTER FOR REMOVAL OF SUTURES: Primary | ICD-10-CM

## 2023-02-22 NOTE — PROGRESS NOTES
[facility]        NAME: Genesis Hill is a 77 y o  female  : 1955    MRN: [id number]  DATE: 2023  TIME: 11:22 AM    Assessment and Plan   Encounter for removal of sutures [Z48 02]  1  Encounter for removal of sutures          Suture removal    Date/Time: 2023 10:58 AM  Performed by: JEFF Torres  Authorized by: JEFF Torres   Universal Protocol:  Consent: Verbal consent obtained  Risks and benefits: risks, benefits and alternatives were discussed  Consent given by: patient  Time out: Immediately prior to procedure a "time out" was called to verify the correct patient, procedure, equipment, support staff and site/side marked as required  Patient understanding: patient states understanding of the procedure being performed  Patient consent: the patient's understanding of the procedure matches consent given  Patient identity confirmed: verbally with patient        Patient location:  Clinic  Location:     Laterality:  Right    Location:  Upper extremity    Upper extremity location:  Hand    Hand location:  R small finger  Procedure details: Tools used:  Suture removal kit    Wound appearance:  No sign(s) of infection, nontender and good wound healing    Number of sutures removed:  5  Post-procedure details:     Post-removal:  Band-Aid applied    Patient tolerance of procedure:   Tolerated well, no immediate complications        Patient Instructions     Seek care immediately if:   • Your wound splits open or is starting to come apart      • You suddenly cannot move your injured joint      • You have sudden numbness around your wound      • You see red streaks coming from your wound      Contact your healthcare provider if:   • You have a fever and chills      • Your wound is red, warm, swollen, or leaking pus      • There is a bad smell coming from your wound      • You have increased pain in the wound area      • You have questions or concerns about your condition or care  Chief Complaint     Chief Complaint   Patient presents with   • Suture / Staple Removal     Here today for suture removal of right little finger         History of Present Illness       Patient presenting for suture removal to right little finger  Patient states that she sustained a laceration 1 week ago  She denies any bleeding or drainage from the area  She denies any swelling, redness or tenderness  She denies any fevers or chills  Review of Systems   Review of Systems   Constitutional: Negative for chills and fever  Musculoskeletal: Negative for arthralgias  Skin: Positive for wound (healed laceration)  Negative for color change  Neurological: Negative for tremors  All other systems reviewed and are negative          Current Medications       Current Outpatient Medications:   •  ALPRAZolam (XANAX) 0 5 mg tablet, Take 1 mg by mouth daily, Disp: , Rfl:   •  azelastine (ASTELIN) 0 1 % nasal spray, 1 spray into each nostril 2 (two) times a day Use in each nostril as directed, Disp: , Rfl:   •  Cetirizine HCl 10 MG CAPS, Take 1 tablet by mouth, Disp: , Rfl:   •  dicyclomine (BENTYL) 10 mg capsule, TAKE 1 CAPSULE BY MOUTH FOUR TIMES DAILY AS NEEDED, Disp: , Rfl: 2  •  EPINEPHrine (EPIPEN) 0 3 mg/0 3 mL SOAJ, Inject as directed, Disp: , Rfl:   •  fluocinonide (LIDEX) 0 05 % cream, Apply topically 2 (two) times a day as needed, Disp: , Rfl:   •  fluticasone (FLONASE) 50 mcg/act nasal spray, 1 spray into each nostril daily, Disp: , Rfl:   •  levothyroxine 25 mcg tablet, Take by mouth, Disp: , Rfl:   •  Melatonin 5 MG/15ML LIQD, Take 2 mg by mouth, Disp: , Rfl:   •  omeprazole (PriLOSEC) 20 mg delayed release capsule, Take 20 mg by mouth daily, Disp: , Rfl: 3  •  ranitidine (ZANTAC) 150 MG capsule, Take 150 mg by mouth 2 (two) times a day, Disp: , Rfl:   •  Selenium 200 MCG CAPS, Take 1 capsule by mouth, Disp: , Rfl:   •  SSD 1 % cream, APPLY TOPICALLY TWO TIMES DAILY UNTIL HEALED, Disp: , Rfl: 1    Current Allergies     Allergies as of 02/22/2023 - Reviewed 02/22/2023   Allergen Reaction Noted   • Gluten meal - food allergy  05/22/2015   • Latex  09/10/2012   • Nickel  06/26/2019   • Psyllium  06/26/2019   • Zolpidem Headache and Other (See Comments) 09/10/2012            The following portions of the patient's history were reviewed and updated as appropriate: allergies, current medications, past family history, past medical history, past social history, past surgical history and problem list      Past Medical History:   Diagnosis Date   • Allergic    • Anxiety    • Disease of thyroid gland    • GERD (gastroesophageal reflux disease)    • Hypotension        Past Surgical History:   Procedure Laterality Date   • BASAL CELL CARCINOMA EXCISION     • COLONOSCOPY     • WISDOM TOOTH EXTRACTION      X3       Family History   Problem Relation Age of Onset   • Heart disease Mother    • Uterine cancer Mother 50   • Aneurysm Father    • Hypertension Brother    • No Known Problems Maternal Grandmother    • No Known Problems Maternal Grandfather    • No Known Problems Paternal Grandmother    • No Known Problems Paternal Grandfather    • Breast cancer Maternal Aunt 48   • Prostate cancer Maternal Uncle          Medications have been verified  Objective   /66 (BP Location: Left arm, Patient Position: Sitting, Cuff Size: Standard)   Pulse 84   Temp 97 8 °F (36 6 °C)   Resp 18   SpO2 98%        Physical Exam     Physical Exam  Vitals and nursing note reviewed  Constitutional:       Appearance: Normal appearance  She is normal weight  HENT:      Head: Normocephalic and atraumatic  Eyes:      General:         Right eye: No discharge  Left eye: No discharge  Cardiovascular:      Rate and Rhythm: Normal rate  Pulses: Normal pulses  Pulmonary:      Effort: Pulmonary effort is normal       Breath sounds: Normal breath sounds  Skin:     General: Skin is warm and dry  Capillary Refill: Capillary refill takes less than 2 seconds  Comments: Healed laceration to medial aspect of right little finger, no active bleeding or drainage, no erythema or swelling, good wound healing noted  Neurological:      Mental Status: She is alert     Psychiatric:         Mood and Affect: Mood normal          Behavior: Behavior normal

## 2023-02-22 NOTE — PATIENT INSTRUCTIONS
Seek care immediately if:   Your wound splits open or is starting to come apart  You suddenly cannot move your injured joint  You have sudden numbness around your wound  You see red streaks coming from your wound  Contact your healthcare provider if:   You have a fever and chills  Your wound is red, warm, swollen, or leaking pus  There is a bad smell coming from your wound  You have increased pain in the wound area  You have questions or concerns about your condition or care

## 2023-03-10 ENCOUNTER — HOSPITAL ENCOUNTER (OUTPATIENT)
Dept: RADIOLOGY | Facility: HOSPITAL | Age: 68
Discharge: HOME | End: 2023-03-10

## 2023-03-10 DIAGNOSIS — E03.9 HYPOTHYROIDISM, UNSPECIFIED: ICD-10-CM

## 2023-03-30 ENCOUNTER — HOSPITAL ENCOUNTER (OUTPATIENT)
Dept: RADIOLOGY | Age: 68
Discharge: HOME | End: 2023-03-30

## 2023-03-30 VITALS — BODY MASS INDEX: 29.88 KG/M2 | WEIGHT: 175 LBS | HEIGHT: 64 IN

## 2023-03-30 DIAGNOSIS — Z12.31 ENCOUNTER FOR SCREENING MAMMOGRAM FOR MALIGNANT NEOPLASM OF BREAST: ICD-10-CM

## 2023-11-17 ENCOUNTER — ESTABLISHED COMPREHENSIVE EXAM (OUTPATIENT)
Dept: URBAN - METROPOLITAN AREA CLINIC 6 | Facility: CLINIC | Age: 68
End: 2023-11-17

## 2023-11-17 DIAGNOSIS — H25.13: ICD-10-CM

## 2023-11-17 PROCEDURE — 92014 COMPRE OPH EXAM EST PT 1/>: CPT

## 2023-11-17 ASSESSMENT — VISUAL ACUITY
OS_CC: 20/25
OD_CC: 20/25

## 2023-11-17 ASSESSMENT — TONOMETRY
OD_IOP_MMHG: 17
OS_IOP_MMHG: 17

## 2023-11-30 ENCOUNTER — BRIEF FOLLOW-UP (OUTPATIENT)
Dept: URBAN - METROPOLITAN AREA CLINIC 6 | Facility: CLINIC | Age: 68
End: 2023-11-30

## 2023-11-30 DIAGNOSIS — H52.4: ICD-10-CM

## 2023-11-30 DIAGNOSIS — H52.13: ICD-10-CM

## 2023-11-30 DIAGNOSIS — H25.13: ICD-10-CM

## 2023-11-30 DIAGNOSIS — H52.203: ICD-10-CM

## 2023-11-30 PROCEDURE — 92012 INTRM OPH EXAM EST PATIENT: CPT

## 2023-11-30 PROCEDURE — 92015 DETERMINE REFRACTIVE STATE: CPT

## 2023-11-30 ASSESSMENT — TONOMETRY
OD_IOP_MMHG: 20
OS_IOP_MMHG: 20

## 2023-11-30 ASSESSMENT — VISUAL ACUITY
OD_CC: 20/25
OS_CC: 20/25
OU_CC: J1+

## 2024-02-14 ENCOUNTER — APPOINTMENT (OUTPATIENT)
Dept: RADIOLOGY | Age: 69
End: 2024-02-14
Payer: MEDICARE

## 2024-02-14 DIAGNOSIS — J98.01 COUGH DUE TO BRONCHOSPASM: ICD-10-CM

## 2024-02-14 PROCEDURE — 71046 X-RAY EXAM CHEST 2 VIEWS: CPT

## 2024-11-27 ENCOUNTER — HOSPITAL ENCOUNTER (OUTPATIENT)
Dept: RADIOLOGY | Age: 69
Discharge: HOME | End: 2024-11-27
Payer: MEDICARE

## 2024-11-27 DIAGNOSIS — Z12.31 ENCOUNTER FOR SCREENING MAMMOGRAM FOR MALIGNANT NEOPLASM OF BREAST: ICD-10-CM

## 2024-11-27 PROCEDURE — 77067 SCR MAMMO BI INCL CAD: CPT

## 2024-11-27 PROCEDURE — 77063 BREAST TOMOSYNTHESIS BI: CPT

## 2024-12-09 ENCOUNTER — OFFICE VISIT (OUTPATIENT)
Dept: URGENT CARE | Facility: MEDICAL CENTER | Age: 69
End: 2024-12-09
Payer: MEDICARE

## 2024-12-09 VITALS — OXYGEN SATURATION: 99 % | HEART RATE: 75 BPM | TEMPERATURE: 97.9 F | RESPIRATION RATE: 16 BRPM

## 2024-12-09 DIAGNOSIS — H00.014 HORDEOLUM EXTERNUM OF LEFT UPPER EYELID: Primary | ICD-10-CM

## 2024-12-09 PROCEDURE — 99213 OFFICE O/P EST LOW 20 MIN: CPT | Performed by: FAMILY MEDICINE

## 2024-12-09 PROCEDURE — G0463 HOSPITAL OUTPT CLINIC VISIT: HCPCS | Performed by: FAMILY MEDICINE

## 2024-12-09 RX ORDER — CIPROFLOXACIN HYDROCHLORIDE 3.5 MG/ML
1 SOLUTION/ DROPS TOPICAL
Qty: 5 ML | Refills: 0 | Status: SHIPPED | OUTPATIENT
Start: 2024-12-09

## 2024-12-09 NOTE — PROGRESS NOTES
"  [facility]        NAME: Catarina Dcaosta is a 69 y.o. female  : 1955    MRN: [id number]  DATE: 2024  TIME: 1:07 PM    Assessment and Plan   Hordeolum externum of left upper eyelid [H00.014]  1. Hordeolum externum of left upper eyelid  ciprofloxacin (CILOXAN) 0.3 % ophthalmic solution        Most likely stye  Educated on rinses and warm compresses.  Given antibiotic drops for comfort  Follow up with ophthalmology if not improving   ED precautions discussed.       Patient Instructions       Follow up with PCP in 3-5 days.  Proceed to  ER if symptoms worsen.    If tests have been performed at [facility], our office will contact you with results if changes need to be made to the care plan discussed with you at the visit.  [facility]    Chief Complaint     Chief Complaint   Patient presents with    Eye Problem     Left eyelid swelling. Pt states she had cold sx. 5 days ago pt was rubbing eye and felt a \"gritty\" sensation. Applying warm compresses and stye eye drops and eye hygiene wash. No fever or chills.          History of Present Illness       Eye Problem   The left eye is affected. This is a new problem. The current episode started in the past 7 days. The problem occurs constantly. The problem has been unchanged. There was no injury mechanism. The pain is at a severity of 0/10. The patient is experiencing no pain. There is No known exposure to pink eye. She Does not wear contacts. Associated symptoms include eye redness. Pertinent negatives include no blurred vision, eye discharge, double vision, fever, foreign body sensation, itching, nausea, photophobia, recent URI or vomiting. She has tried commercial eye wash (warm compress) for the symptoms. The treatment provided mild relief.       Review of Systems   Review of Systems   Constitutional:  Negative for fever.   Eyes:  Positive for redness. Negative for blurred vision, double vision, " photophobia, discharge and itching.   Gastrointestinal:  Negative for nausea and vomiting.         Current Medications       Current Outpatient Medications:     Acetylcysteine 600 MG CAPS, Take 600 mg by mouth daily, Disp: , Rfl:     Acidophilus Lactobacillus CAPS, Take by mouth, Disp: , Rfl:     ALPRAZolam (XANAX) 0.5 mg tablet, Take 1 mg by mouth daily, Disp: , Rfl:     Bioflavonoid Products (Quercetin Complex Immune) CAPS, Take 1 capsule by mouth, Disp: , Rfl:     Biotin w/ Vitamins C & E (Hair Skin & Nails Gummies) 1250-7.5-7.5 MCG-MG-UNT CHEW, Chew, Disp: , Rfl:     calcium carbonate (TUMS) 500 mg chewable tablet, Chew 500 mg, Disp: , Rfl:     Cetirizine HCl 10 MG CAPS, Take 1 tablet by mouth, Disp: , Rfl:     ciprofloxacin (CILOXAN) 0.3 % ophthalmic solution, Administer 1 drop into the left eye every 2 (two) hours, Disp: 5 mL, Rfl: 0    famotidine (PEPCID) 20 mg tablet, Take 20 mg by mouth 2 (two) times a day, Disp: , Rfl:     GINSENG EXTRACT PO, Take 2 tablets by mouth, Disp: , Rfl:     glucosamine 500 MG CAPS capsule, Take 2 tablets by mouth daily, Disp: , Rfl:     ibuprofen (MOTRIN) 600 mg tablet, Take by mouth every 6 (six) hours as needed for mild pain, Disp: , Rfl:     levothyroxine 25 mcg tablet, Take by mouth, Disp: , Rfl:     Magnesium Citrate POWD, Use, Disp: , Rfl:     Melatonin 5 MG/15ML LIQD, Take 2 mg by mouth, Disp: , Rfl:     Misc Natural Products (Glucosamine Chondroitin Adv) TABS, Take 2 tablets by mouth daily, Disp: , Rfl:     Misc Natural Products (SynovX CALM) CAPS, Take by mouth, Disp: , Rfl:     Plant Sterols and Stanols 450 MG CAPS, Take by mouth, Disp: , Rfl:     QUERCETIN PO, Take 1 capsule by mouth daily, Disp: , Rfl:     Selenium 200 MCG CAPS, Take 1 capsule by mouth, Disp: , Rfl:     Ubiquinol 100 MG CAPS, Take by mouth, Disp: , Rfl:     Zinc Gluconate 100 MG TABS, Take 50 mg by mouth daily, Disp: , Rfl:     Current Allergies     Allergies as of 12/09/2024 - Reviewed 12/09/2024    Allergen Reaction Noted    Gluten meal - food allergy  05/22/2015    Latex  09/10/2012    Nickel  06/26/2019    Psyllium  06/26/2019    Zolpidem Headache and Other (See Comments) 09/10/2012            The following portions of the patient's history were reviewed and updated as appropriate: allergies, current medications, past family history, past medical history, past social history, past surgical history and problem list.     Past Medical History:   Diagnosis Date    Allergic     Anxiety     Arthritis     Cataract 2024    bilateral    GERD (gastroesophageal reflux disease)     Hashimoto's thyroiditis     Headache     Hypertension     Hypothyroidism     Osteopenia     Pneumonia     3 yrs of age    Rash     resolved    Sinusitis     Wears glasses     Wears partial dentures     top       Past Surgical History:   Procedure Laterality Date    BASAL CELL CARCINOMA EXCISION      COLONOSCOPY      DENTAL SURGERY      upper teeth removal    EGD AND COLONOSCOPY  2022    WISDOM TOOTH EXTRACTION      X3       Family History   Problem Relation Age of Onset    Heart disease Mother     Uterine cancer Mother 48    Aneurysm Father     Hypertension Brother     No Known Problems Maternal Grandmother     No Known Problems Maternal Grandfather     No Known Problems Paternal Grandmother     No Known Problems Paternal Grandfather     Breast cancer Maternal Aunt 48    Prostate cancer Maternal Uncle          Medications have been verified.        Objective   Pulse 75   Temp 97.9 °F (36.6 °C)   Resp 16   SpO2 99%   No LMP recorded. Patient is postmenopausal.       Physical Exam     Physical Exam  Vitals reviewed.   Constitutional:       Appearance: Normal appearance.   HENT:      Head: Normocephalic and atraumatic.   Eyes:      General:         Left eye: Hordeolum present.    Cardiovascular:      Rate and Rhythm: Normal rate.   Pulmonary:      Effort: Pulmonary effort is normal.   Skin:     General: Skin is warm and dry.      Capillary  Refill: Capillary refill takes less than 2 seconds.   Neurological:      General: No focal deficit present.      Mental Status: She is alert and oriented to person, place, and time.   Psychiatric:         Mood and Affect: Mood normal.         Behavior: Behavior normal.

## 2024-12-26 ENCOUNTER — PROBLEM (OUTPATIENT)
Dept: URBAN - METROPOLITAN AREA CLINIC 6 | Facility: CLINIC | Age: 69
End: 2024-12-26

## 2024-12-26 DIAGNOSIS — H00.14: ICD-10-CM

## 2024-12-26 PROCEDURE — 92012 INTRM OPH EXAM EST PATIENT: CPT

## 2024-12-26 ASSESSMENT — VISUAL ACUITY
OD_CC: 20/20
OS_CC: 20/25+1

## (undated) RX ORDER — NEOMYCIN SULFATE, POLYMYXIN B SULFATE AND DEXAMETHASONE SUSPENSION/ DROPS 1; 3.5; 1 MG/ML; MG/ML; [USP'U]/ML: 1 SUSPENSION/ DROPS TOPICAL